# Patient Record
Sex: MALE | Race: WHITE | NOT HISPANIC OR LATINO | Employment: FULL TIME | URBAN - METROPOLITAN AREA
[De-identification: names, ages, dates, MRNs, and addresses within clinical notes are randomized per-mention and may not be internally consistent; named-entity substitution may affect disease eponyms.]

---

## 2023-02-20 ENCOUNTER — HOSPITAL ENCOUNTER (EMERGENCY)
Facility: MEDICAL CENTER | Age: 38
End: 2023-02-21
Attending: STUDENT IN AN ORGANIZED HEALTH CARE EDUCATION/TRAINING PROGRAM

## 2023-02-20 DIAGNOSIS — B02.9 HERPES ZOSTER WITHOUT COMPLICATION: ICD-10-CM

## 2023-02-20 PROCEDURE — 99283 EMERGENCY DEPT VISIT LOW MDM: CPT

## 2023-02-20 PROCEDURE — 36415 COLL VENOUS BLD VENIPUNCTURE: CPT

## 2023-02-20 ASSESSMENT — PAIN DESCRIPTION - PAIN TYPE: TYPE: ACUTE PAIN

## 2023-02-21 VITALS
HEIGHT: 72 IN | RESPIRATION RATE: 16 BRPM | WEIGHT: 134.48 LBS | SYSTOLIC BLOOD PRESSURE: 102 MMHG | BODY MASS INDEX: 18.21 KG/M2 | HEART RATE: 73 BPM | TEMPERATURE: 97 F | DIASTOLIC BLOOD PRESSURE: 62 MMHG | OXYGEN SATURATION: 95 %

## 2023-02-21 LAB — HIV 1+2 AB+HIV1 P24 AG SERPL QL IA: NORMAL

## 2023-02-21 PROCEDURE — 87389 HIV-1 AG W/HIV-1&-2 AB AG IA: CPT

## 2023-02-21 RX ORDER — ACYCLOVIR 400 MG/1
800 TABLET ORAL
Qty: 70 TABLET | Refills: 0 | Status: ACTIVE | OUTPATIENT
Start: 2023-02-21 | End: 2023-02-28

## 2023-02-21 RX ORDER — GABAPENTIN 300 MG/1
300 CAPSULE ORAL 3 TIMES DAILY
Qty: 90 CAPSULE | Refills: 0 | Status: SHIPPED | OUTPATIENT
Start: 2023-02-21

## 2023-02-21 NOTE — ED NOTES
Pt ambulated to room with steady gait, agree with triage note. Pt changing into gown, chart up for ERP.

## 2023-02-21 NOTE — ED TRIAGE NOTES
.  Chief Complaint   Patient presents with    Rash     Right flank x 3 days      Pt ambulate to triage with above complaint. Painful at sight, Pt is concerned for shingles.   Pt educated on triage process and returned to lobby.

## 2023-02-21 NOTE — ED PROVIDER NOTES
ED Provider Note    CHIEF COMPLAINT  Chief Complaint   Patient presents with    Rash     Right flank x 3 days       HPI/ROS  LIMITATION TO HISTORY   Select: : None  OUTSIDE HISTORIAN(S):  Significant other reports that the symptoms have been ongoing for 2 days    Valeriy Vargas is a 37 y.o. male with no significant past medical history who presents with right hip pain followed by a rash.  Patient reports pain is started 4 days ago and then 2 days ago rash started.  Patient reports that it is red, painful and nonradiating.  Patient also endorses swollen lymph nodes in the groin.  Patient denies fevers, chills, bodies, sweats.  Patient does report having history of chickenpox when he was younger.    PAST MEDICAL HISTORY       SURGICAL HISTORY  patient denies any surgical history    FAMILY HISTORY  History reviewed. No pertinent family history.    SOCIAL HISTORY  Social History     Tobacco Use    Smoking status: Former    Smokeless tobacco: Not on file   Substance and Sexual Activity    Alcohol use: Yes    Drug use: Yes     Comment: mj    Sexual activity: Not on file       CURRENT MEDICATIONS  Home Medications       Reviewed by Lory Perla R.N. (Registered Nurse) on 02/20/23 at 2255  Med List Status: Partial     Medication Last Dose Status   hydrocodone-acetaminophen (NORCO) 5-325 MG TABS per tablet  Active                    ALLERGIES  Allergies   Allergen Reactions    Pcn [Penicillins]     Shellfish Allergy        PHYSICAL EXAM  VITAL SIGNS: /62   Pulse 73   Temp 36.4 °C (97.5 °F) (Temporal)   Resp 16   Ht 1.829 m (6')   Wt 61 kg (134 lb 7.7 oz)   SpO2 95%   BMI 18.24 kg/m²    Vitals and nursing note reviewed.   Constitutional:       Comments: Patient is lying in bed supine, pleasant, conversant, speaking in complete sentences   HENT:      Head: Normocephalic and atraumatic.   Eyes:      Extraocular Movements: Extraocular movements intact.      Conjunctiva/sclera: Conjunctivae normal.      Pupils:  Pupils are equal, round, and reactive to light.   Cardiovascular:      Pulses: Normal pulses.      Comments: HR 82  Pulmonary:      Effort: Pulmonary effort is normal. No respiratory distress.   Musculoskeletal:         General: No swelling. Normal range of motion.      Cervical back: Normal range of motion. No rigidity.   Skin:     General: Skin is warm and dry.      Capillary Refill: Capillary refill takes less than 2 seconds.   Vesicular rash to the right anterior hip extending to the lateral aspect of the hip with some crusting and erythema at the base, positive right inguinal lymphadenopathy  Neurological:      Mental Status: Alert.       LABS  HIV negative        COURSE & MEDICAL DECISION MAKING        INITIAL ASSESSMENT, COURSE AND PLAN  Care Narrative: Patient has no history of immunocompromise, the rashes quite large in size, I do think it is appropriate to test the patient for HIV given that the rash may cross multiple dermatomes, disposition pending HIV.  However I do believe the patient is suffering from zoster.  No evidence of sepsis or disseminated infection at this time.    Electronically signed by: Albino Blakely M.D., 2/21/2023 12:46 AM    HIV negative, disseminated zoster less likely at this time.  Patient will be given acyclovir, gabapentin and counseled to follow-up with PCP.  Patient can return to the emergency department should his symptoms worsen or he develops fever.    Repeat physical exam benign.  I doubt any serious emergency process at this time.  Patient and/or family, friends given strict return precautions and care instructions. They have demonstrated understanding of discharge instructions through teach back mechanism. Advised PCP follow-up in 1-2 days.  Patient/family/friend expresses understanding and agrees to plan.    This dictation has been created using voice recognition software. I am continuously working with the software to minimize the number of voice recognition  errors and I have made every attempt to manually correct the errors within my dictation. However errors  related to this voice recognition software may still exist and should be interpreted within the appropriate context.     Electronically signed by: Albino Blakely M.D., 2/21/2023 1:35 AM        ADDITIONAL PROBLEM LIST    DISPOSITION AND DISCUSSIONS    Escalation of care considered, and ultimately not performed:IV fluids, diagnostic imaging, and acute inpatient care management, however at this time, the patient is most appropriate for outpatient management        Decision tools and prescription drugs considered including, but not limited to: Antibiotics not indicated in the context of a viral infection .    FINAL DIAGNOSIS  1. Herpes zoster without complication           Electronically signed by: Albino Blakely M.D., 2/21/2023 12:43 AM

## 2023-02-21 NOTE — ED NOTES
Pt discharged, all appropriate hospital equipment removed (IV, monitor, pulse ox, etc.). Pt left unit via walking with partner to vehicle for home. Personal belongings with pt when leaving unit. Pt given discharge instructions prior to leaving unit including where to  prescriptions and when to follow-up; verbalizes understanding. Pt informed to return to ED if symptoms worsen/return or altered status develop. Copy of discharge instructions signed and turned into DC basket and copy sent with pt. F/u with PCP, HIV info given.